# Patient Record
(demographics unavailable — no encounter records)

---

## 2024-12-11 NOTE — HISTORY OF PRESENT ILLNESS
[Home] : at home, [unfilled] , at the time of the visit. [Medical Office: ([facility])___] : at the medical office located in  [Verbal consent obtained from patient] : the patient, [unfilled] [FreeTextEntry1] : med refill  [de-identified] : Patient is an 87-year-old female history of HTN, arthritis, HLD, osteoporosis, hypothyroidism, pre-DM virtual visit for medication refills.  Patient is new to provider today. Preciously following with Dr. Ca as PCP.   1. Hypothyroidism  -Currently on Levothyroxine 88mcg -TSH last done 7/24 WNL -Reports no side effects from current medications  -Has been stable for yrs   2. HTN -Currently on HCTZ 12.5 and Losartan 100mg Reports no side effects from current medications   3. HLD  -Currently on Simvastatin Reports no side effects from current medications

## 2025-04-24 NOTE — HISTORY OF PRESENT ILLNESS
[Home] : at home, [unfilled] , at the time of the visit. [Medical Office: ([facility])___] : at the medical office located in  [Telehealth (audio & video)] : This visit was provided via telehealth using real-time 2-way audio visual technology. [Verbal consent obtained from patient] : the patient, [unfilled] [FreeTextEntry1] : Letter  [de-identified] : Patient is an 88-year-old female history of hypothyroidism, HTN, HLD, pre-DM presents to clinic via virtual visit for letter.  Needs help with IADLS and some ADLs including (bathing, walking and food prep. able to go to bathroom on her own and can transfer with minimal help  Daughter is primary care giver and would need to be home prior to 4pm

## 2025-04-24 NOTE — PHYSICAL EXAM
[Normal Voice/Communication] : normal voice/communication [Normal Sclera/Conjunctiva] : normal sclera/conjunctiva [Normal Outer Ear/Nose] : the outer ears and nose were normal in appearance [No Respiratory Distress] : no respiratory distress  [Normal] : affect was normal and insight and judgment were intact

## 2025-05-14 NOTE — PROCEDURE
[de-identified] : patients wants a cortisone injection.  risks, benefits, and alternatives discussed and patient gave consent.  under sterile conditions using the anterolateral portal i injected 80mg of depomedrol and 2cc of 0.25% marcaine.  patient tolerated procedure well and will rest and ice and fu when injection wears off. left knee

## 2025-05-14 NOTE — HISTORY OF PRESENT ILLNESS
[de-identified] : Patient comes in with more than 6 months of left knee pain atraumatic in onset.  Patient has tried greater than 6 months of nsaids, physical therapy and doctor directed exercises  Patient continues to have pain that is 8/10 in severity and interferes with activities of daily living such as putting on shoes and socks, walking two blocks, and getting up and down from a chair and toilet.  Knee osteoarthritis outcome score is 8.1

## 2025-05-14 NOTE — PHYSICAL EXAM
[de-identified] : Knee ranges 5-115 degrees with pain and crepitus stable to varus, valgus, anterior and posterior stress neurovascularly intact distally no pain with hip range of motion negative straight leg raise no effusion, synovitis, or edema or erythema skin intact [de-identified] : 4 views left knee multiple views of the knees show severe arthritis with bone on bone contact, laverne-articular osteophytes, subchondral sclerosis and cysts

## 2025-07-28 NOTE — ASSESSMENT
[Vaccines Reviewed] : Immunizations reviewed today. Please see immunization details in the vaccine log within the immunization flowsheet.  [FreeTextEntry1] : Encouraged patient to receive shingles vaccine from local pharmacy Diet and exercise discussed

## 2025-07-28 NOTE — HISTORY OF PRESENT ILLNESS
[FreeTextEntry1] : Physical exam [de-identified] : Patient is an 88-year-old female history of HTN, HLD, pre-DM presents to clinic today for physical exam.  No acute concerns but would like to know if able to stop certain medications given age

## 2025-07-28 NOTE — HEALTH RISK ASSESSMENT
[Good] : ~his/her~  mood as  good [No] : No [0] : 1) Little interest or pleasure doing things: Not at all (0) [1] : 2) Feeling down, depressed, or hopeless for several days (1) [Never] : Never [20 or more] : 20 or more [NO] : No [PHQ-2 Negative - No further assessment needed] : PHQ-2 Negative - No further assessment needed [Patient reported bone density results were normal] : Patient reported bone density results were normal [With Family] : lives with family [Retired] : retired [Feels Safe at Home] : Feels safe at home [Reports changes in hearing] : Reports changes in hearing [Reports normal functional visual acuity (ie: able to read med bottle)] : Reports normal functional visual acuity [With Patient/Caregiver] : , with patient/caregiver [Time Spent: ___ minutes] : Time Spent: [unfilled] minutes [FreeTextEntry1] : lose balance when walking, arthritis  [de-identified] : limited due to arthritis [de-identified] : likes breads and cakes but has cut back. Likes vegetables and overall balanced [GOT3Ocsah] : 1 [Reports changes in vision] : Reports no changes in vision [Reports changes in dental health] : Reports no changes in dental health [BoneDensityDate] : 09/23 [de-identified] : needs help [de-identified] : needs help [AdvancecareDate] : 07/25

## 2025-07-28 NOTE — PHYSICAL EXAM
[No Acute Distress] : no acute distress [Well Nourished] : well nourished [Well Developed] : well developed [Well-Appearing] : well-appearing [Normal Sclera/Conjunctiva] : normal sclera/conjunctiva [PERRL] : pupils equal round and reactive to light [EOMI] : extraocular movements intact [Normal Outer Ear/Nose] : the outer ears and nose were normal in appearance [Normal Oropharynx] : the oropharynx was normal [No JVD] : no jugular venous distention [No Lymphadenopathy] : no lymphadenopathy [Supple] : supple [Thyroid Normal, No Nodules] : the thyroid was normal and there were no nodules present [No Respiratory Distress] : no respiratory distress  [No Accessory Muscle Use] : no accessory muscle use [Clear to Auscultation] : lungs were clear to auscultation bilaterally [Normal Rate] : normal rate  [Regular Rhythm] : with a regular rhythm [Normal S1, S2] : normal S1 and S2 [No Murmur] : no murmur heard [No Varicosities] : no varicosities [Pedal Pulses Present] : the pedal pulses are present [No Edema] : there was no peripheral edema [No Extremity Clubbing/Cyanosis] : no extremity clubbing/cyanosis [Soft] : abdomen soft [Non Tender] : non-tender [Non-distended] : non-distended [No Masses] : no abdominal mass palpated [No HSM] : no HSM [Normal Bowel Sounds] : normal bowel sounds [Normal Posterior Cervical Nodes] : no posterior cervical lymphadenopathy [Normal Anterior Cervical Nodes] : no anterior cervical lymphadenopathy [No CVA Tenderness] : no CVA  tenderness [No Spinal Tenderness] : no spinal tenderness [No Joint Swelling] : no joint swelling [Grossly Normal Strength/Tone] : grossly normal strength/tone [No Rash] : no rash [Coordination Grossly Intact] : coordination grossly intact [No Focal Deficits] : no focal deficits [Normal Gait] : normal gait [Normal Affect] : the affect was normal [Normal Insight/Judgement] : insight and judgment were intact [Normal TMs] : both tympanic membranes were normal